# Patient Record
Sex: FEMALE | URBAN - METROPOLITAN AREA
[De-identification: names, ages, dates, MRNs, and addresses within clinical notes are randomized per-mention and may not be internally consistent; named-entity substitution may affect disease eponyms.]

---

## 2017-02-23 ENCOUNTER — APPOINTMENT (OUTPATIENT)
Dept: URBAN - METROPOLITAN AREA CLINIC 189 | Age: 62
Setting detail: DERMATOLOGY
End: 2017-02-23

## 2017-02-23 DIAGNOSIS — L63.8 OTHER ALOPECIA AREATA: ICD-10-CM

## 2017-02-23 PROCEDURE — 11900 INJECT SKIN LESIONS </W 7: CPT

## 2017-02-23 PROCEDURE — OTHER TREATMENT REGIMEN: OTHER

## 2017-02-23 PROCEDURE — OTHER MIPS QUALITY: OTHER

## 2017-02-23 PROCEDURE — OTHER COUNSELING: OTHER

## 2017-02-23 PROCEDURE — OTHER INTRALESIONAL KENALOG: OTHER

## 2017-02-23 ASSESSMENT — LOCATION DETAILED DESCRIPTION DERM
LOCATION DETAILED: LEFT SUPERIOR PARIETAL SCALP
LOCATION DETAILED: LEFT CENTRAL FRONTAL SCALP
LOCATION DETAILED: RIGHT LATERAL FRONTAL SCALP
LOCATION DETAILED: MID-OCCIPITAL SCALP

## 2017-02-23 ASSESSMENT — LOCATION SIMPLE DESCRIPTION DERM
LOCATION SIMPLE: POSTERIOR SCALP
LOCATION SIMPLE: RIGHT SCALP
LOCATION SIMPLE: LEFT SCALP
LOCATION SIMPLE: SCALP

## 2017-02-23 ASSESSMENT — LOCATION ZONE DERM: LOCATION ZONE: SCALP

## 2017-02-23 NOTE — PROCEDURE: MIPS QUALITY
Quality 226: Preventive Care And Screening: Tobacco Use: Screening And Cessation Intervention: Patient screened for tobacco and never smoked
Quality 111:Pneumonia Vaccination Status For Older Adults: Pneumococcal Vaccination not Administered or Previously Received, Reason not Otherwise Specified
Quality 110: Preventive Care And Screening: Influenza Immunization: Influenza Immunization Ordered or Recommended, but not Administered
Detail Level: Generalized

## 2017-02-23 NOTE — PROCEDURE: INTRALESIONAL KENALOG
Detail Level: Zone
Medical Necessity Clause: This procedure was medically necessary because the lesions that were treated were:
X Size Of Lesion In Cm (Optional): 0
Consent: The risks of atrophy were reviewed with the patient.
Concentration Of Solution Injected (Mg/Ml): 2.5
Administered By (Optional): Alanna Weston PA-C
Total Volume Injected (Ccs- Only Use Numbers And Decimals): 4.5
Include Z78.9 (Other Specified Conditions Influencing Health Status) As An Associated Diagnosis?: No
Kenalog Preparation: Kenalog

## 2017-02-23 NOTE — PROCEDURE: TREATMENT REGIMEN
Continue Regimen: Olux-E: twice daily to affected areas on scalp for 2 weeks, then stop for 1 week, then repeat cycle. Discussed importance of following instructions exactly
Detail Level: Zone
Plan: Referral to Dr. Esthela Jerez for further evaluation and treatment

## 2017-03-23 ENCOUNTER — APPOINTMENT (OUTPATIENT)
Dept: URBAN - METROPOLITAN AREA CLINIC 189 | Age: 62
Setting detail: DERMATOLOGY
End: 2017-03-23

## 2017-03-23 DIAGNOSIS — L63.8 OTHER ALOPECIA AREATA: ICD-10-CM

## 2017-03-23 PROBLEM — E05.90 THYROTOXICOSIS, UNSPECIFIED WITHOUT THYROTOXIC CRISIS OR STORM: Status: ACTIVE | Noted: 2017-03-23

## 2017-03-23 PROCEDURE — OTHER INTRALESIONAL KENALOG: OTHER

## 2017-03-23 PROCEDURE — 11900 INJECT SKIN LESIONS </W 7: CPT

## 2017-03-23 PROCEDURE — OTHER TREATMENT REGIMEN: OTHER

## 2017-03-23 PROCEDURE — OTHER MIPS QUALITY: OTHER

## 2017-03-23 PROCEDURE — OTHER COUNSELING: OTHER

## 2017-03-23 ASSESSMENT — LOCATION SIMPLE DESCRIPTION DERM
LOCATION SIMPLE: POSTERIOR SCALP
LOCATION SIMPLE: SCALP
LOCATION SIMPLE: RIGHT SCALP
LOCATION SIMPLE: LEFT SCALP

## 2017-03-23 ASSESSMENT — LOCATION DETAILED DESCRIPTION DERM
LOCATION DETAILED: MID-OCCIPITAL SCALP
LOCATION DETAILED: LEFT CENTRAL FRONTAL SCALP
LOCATION DETAILED: LEFT SUPERIOR PARIETAL SCALP
LOCATION DETAILED: RIGHT LATERAL FRONTAL SCALP

## 2017-03-23 ASSESSMENT — LOCATION ZONE DERM: LOCATION ZONE: SCALP

## 2017-03-23 NOTE — PROCEDURE: INTRALESIONAL KENALOG
Include Z78.9 (Other Specified Conditions Influencing Health Status) As An Associated Diagnosis?: No
Consent: The risks of atrophy were reviewed with the patient.
X Size Of Lesion In Cm (Optional): 0
Administered By (Optional): Alanna Weston PA-C
Kenalog Preparation: Kenalog
Total Volume Injected (Ccs- Only Use Numbers And Decimals): 4
Medical Necessity Clause: This procedure was medically necessary because the lesions that were treated were:
Concentration Of Solution Injected (Mg/Ml): 5.0
Detail Level: Zone

## 2017-03-23 NOTE — PROCEDURE: TREATMENT REGIMEN
Detail Level: Zone
Continue Regimen: Olux-E: twice daily to affected areas on scalp for 2 weeks, then stop for 1 week, then repeat cycle

## 2017-03-23 NOTE — PROCEDURE: MIPS QUALITY
Quality 110: Preventive Care And Screening: Influenza Immunization: Influenza Immunization Ordered or Recommended, but not Administered
Detail Level: Generalized
Quality 111:Pneumonia Vaccination Status For Older Adults: Pneumococcal Vaccination not Administered or Previously Received, Reason not Otherwise Specified
Quality 226: Preventive Care And Screening: Tobacco Use: Screening And Cessation Intervention: Patient screened for tobacco and never smoked

## 2017-05-04 ENCOUNTER — APPOINTMENT (OUTPATIENT)
Dept: URBAN - METROPOLITAN AREA CLINIC 189 | Age: 62
Setting detail: DERMATOLOGY
End: 2017-05-05

## 2017-05-04 DIAGNOSIS — L63.8 OTHER ALOPECIA AREATA: ICD-10-CM

## 2017-05-04 PROBLEM — I10 ESSENTIAL (PRIMARY) HYPERTENSION: Status: ACTIVE | Noted: 2017-05-04

## 2017-05-04 PROBLEM — L85.3 XEROSIS CUTIS: Status: ACTIVE | Noted: 2017-05-04

## 2017-05-04 PROBLEM — E05.90 THYROTOXICOSIS, UNSPECIFIED WITHOUT THYROTOXIC CRISIS OR STORM: Status: ACTIVE | Noted: 2017-05-04

## 2017-05-04 PROBLEM — L70.0 ACNE VULGARIS: Status: ACTIVE | Noted: 2017-05-04

## 2017-05-04 PROCEDURE — 99212 OFFICE O/P EST SF 10 MIN: CPT

## 2017-05-04 PROCEDURE — OTHER COUNSELING: OTHER

## 2017-05-04 PROCEDURE — OTHER TREATMENT REGIMEN: OTHER

## 2017-05-04 PROCEDURE — OTHER MIPS QUALITY: OTHER

## 2017-05-04 ASSESSMENT — LOCATION ZONE DERM: LOCATION ZONE: SCALP

## 2017-05-04 ASSESSMENT — LOCATION DETAILED DESCRIPTION DERM: LOCATION DETAILED: LEFT SUPERIOR PARIETAL SCALP

## 2017-05-04 ASSESSMENT — LOCATION SIMPLE DESCRIPTION DERM: LOCATION SIMPLE: SCALP

## 2017-05-04 NOTE — PROCEDURE: TREATMENT REGIMEN
Continue Regimen: Olux-E as previously directed. Continue Biotin
Otc Regimen: Recommend brushing less often. Recommend shampooing no more than once per week. May try Rogaine if desired
Plan: Declined ILK since no improvement.\\nInformation given for [facility] if patient is still not seeing improvement
Detail Level: Zone

## 2025-02-05 ENCOUNTER — APPOINTMENT (OUTPATIENT)
Dept: URBAN - METROPOLITAN AREA CLINIC 189 | Age: 70
Setting detail: DERMATOLOGY
End: 2025-02-05

## 2025-02-05 DIAGNOSIS — R21 RASH AND OTHER NONSPECIFIC SKIN ERUPTION: ICD-10-CM

## 2025-02-05 PROCEDURE — OTHER TREATMENT REGIMEN: OTHER

## 2025-02-05 PROCEDURE — 99203 OFFICE O/P NEW LOW 30 MIN: CPT

## 2025-02-05 PROCEDURE — OTHER ORDER TESTS: OTHER

## 2025-02-05 PROCEDURE — OTHER PRESCRIPTION: OTHER

## 2025-02-05 PROCEDURE — OTHER MIPS QUALITY: OTHER

## 2025-02-05 PROCEDURE — OTHER COUNSELING: OTHER

## 2025-02-05 RX ORDER — CLOBETASOL PROPIONATE 0.5 MG/G
CREAM TOPICAL BID
Qty: 60 | Refills: 6 | Status: ERX | COMMUNITY
Start: 2025-02-05

## 2025-02-05 ASSESSMENT — LOCATION SIMPLE DESCRIPTION DERM
LOCATION SIMPLE: LEFT HAND
LOCATION SIMPLE: RIGHT HAND

## 2025-02-05 ASSESSMENT — LOCATION ZONE DERM: LOCATION ZONE: HAND

## 2025-02-05 ASSESSMENT — LOCATION DETAILED DESCRIPTION DERM
LOCATION DETAILED: RIGHT ULNAR PALM
LOCATION DETAILED: LEFT RADIAL PALM

## 2025-02-05 NOTE — PROCEDURE: ORDER TESTS
Performing Laboratory: 0
Expected Date Of Service: 02/05/2025
Bill For Surgical Tray: no
Billing Type: Third-Party Bill

## 2025-03-06 ENCOUNTER — APPOINTMENT (OUTPATIENT)
Dept: URBAN - METROPOLITAN AREA CLINIC 189 | Age: 70
Setting detail: DERMATOLOGY
End: 2025-03-06

## 2025-03-06 DIAGNOSIS — L40.0 PSORIASIS VULGARIS: ICD-10-CM

## 2025-03-06 PROCEDURE — OTHER MIPS QUALITY: OTHER

## 2025-03-06 PROCEDURE — OTHER PRESCRIPTION: OTHER

## 2025-03-06 PROCEDURE — OTHER COUNSELING: OTHER

## 2025-03-06 PROCEDURE — 99213 OFFICE O/P EST LOW 20 MIN: CPT

## 2025-03-06 PROCEDURE — OTHER TREATMENT REGIMEN: OTHER

## 2025-03-06 PROCEDURE — OTHER ADDITIONAL NOTES: OTHER

## 2025-03-06 RX ORDER — TAPINAROF 10 MG/1000MG
CREAM TOPICAL
Qty: 60 | Refills: 6 | Status: ERX | COMMUNITY
Start: 2025-03-06

## 2025-03-06 ASSESSMENT — PGA PSORIASIS: PGA PSORIASIS 2020: MILD

## 2025-03-06 ASSESSMENT — LOCATION DETAILED DESCRIPTION DERM
LOCATION DETAILED: LEFT MEDIAL PLANTAR MIDFOOT
LOCATION DETAILED: LEFT THENAR EMINENCE
LOCATION DETAILED: RIGHT RADIAL PALM

## 2025-03-06 ASSESSMENT — LOCATION SIMPLE DESCRIPTION DERM
LOCATION SIMPLE: LEFT HAND
LOCATION SIMPLE: RIGHT HAND
LOCATION SIMPLE: LEFT PLANTAR SURFACE

## 2025-03-06 ASSESSMENT — LOCATION ZONE DERM
LOCATION ZONE: FEET
LOCATION ZONE: HAND

## 2025-03-06 ASSESSMENT — BSA PSORIASIS: % BODY COVERED IN PSORIASIS: 4

## 2025-03-06 ASSESSMENT — ITCH NUMERIC RATING SCALE: HOW SEVERE IS YOUR ITCHING?: 2

## 2025-03-06 NOTE — PROCEDURE: ADDITIONAL NOTES
Detail Level: Simple
Render Risk Assessment In Note?: no
Additional Notes: Patient has previously tried and failed clobetasol cream

## 2025-03-06 NOTE — PROCEDURE: TREATMENT REGIMEN
Discontinue Regimen: Clobetasol cream - apply twice a day as directed
Initiate Treatment: Vtama 1 % topical cream \\nQuantity: 60.0 g  Days Supply: 30\\nSig: Apply to affected areas once daily as needed
Detail Level: Zone